# Patient Record
(demographics unavailable — no encounter records)

---

## 2025-01-15 NOTE — HISTORY OF PRESENT ILLNESS
[de-identified] : 23 yo F presenting for newly diagnosed DLBCL    --------------------PmHX:-----------------------------------  No significan PMHx   --------------------Oncologic Hx: -------------------------  Patient  initially presented to [facility] for a 2 week history of progressively worsening neck and facial swelling with associated right shoulder pain. She was found to have a right anterior mediastinal mass with aggressive features, and transferred to [facility] for ongoing management. She reports 2 weeks ago she noticed some bulging in her neck, followed by facial and eye swelling a few days later. She was initially seen in urgent care who prescribed an antibiotic for suspected infection, but had no symptom improvement. Reported difficulty laying flat, and some associated chest discomfort. Reported mild dysphagia.. 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 She was evaluated by gyn for fertility preservation and has received lupron. IR was consulted for line placement, but due to extensive DVT in upper extremity adn swelling, she was unable to get port or UE PICC. To receive C1 of treatment, patient had a femoral PICC line placed which was subsequently removed prior to discharge.   Since her hospital discharge, she reports to be doing much better. She states that her upper exremity swelling has resolved. She states that she still has a non productive cough that is worse when laying flat. She reports having little to no pain and using her prescribed oxy very infrequently.   --------------------Interval Hx: -----------------------------   --------------------Treatment Hx: ------------------------- C1 in hospital: rituximab on 1/3/25 and CHOP on 1/6/25   --------------------ImagingHx:------------------------------   --------------------Pathology Results: -------------------- Initial Pre-treatment Biopsy: Date ---Morphology: ---IHC stain: ---FISH: ---Karyotype: ---NGS:   Post initial treatment: Date  ---Morphology: ---IHC stain: ---FISH: ---Karyotype: ---NGS:   XXXX treatment: Date  ---Morphology: ---IHC stain: ---FISH: ---Karyotype: ---NGS:   --------------------Social Hx: --------------------------------   The patient is marital status and has *** children (age ***, gender ***). Patient has *** siblings (age, gender, health concerns). Work:   --------------------Surgical History: ------------------------     --------------------Past family history:----------------------

## 2025-01-15 NOTE — HISTORY OF PRESENT ILLNESS
[de-identified] : 23 yo F presenting for newly diagnosed DLBCL    --------------------PmHX:-----------------------------------  No significan PMHx   --------------------Oncologic Hx: -------------------------  Patient  initially presented to [facility] for a 2 week history of progressively worsening neck and facial swelling with associated right shoulder pain. She was found to have a right anterior mediastinal mass with aggressive features, and transferred to [facility] for ongoing management. She reports 2 weeks ago she noticed some bulging in her neck, followed by facial and eye swelling a few days later. She was initially seen in urgent care who prescribed an antibiotic for suspected infection, but had no symptom improvement. Reported difficulty laying flat, and some associated chest discomfort. Reported mild dysphagia.. 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 She was evaluated by gyn for fertility preservation and has received lupron. IR was consulted for line placement, but due to extensive DVT in upper extremity adn swelling, she was unable to get port or UE PICC. To receive C1 of treatment, patient had a femoral PICC line placed which was subsequently removed prior to discharge.   Since her hospital discharge, she reports to be doing much better. She states that her upper exremity swelling has resolved. She states that she still has a non productive cough that is worse when laying flat. She reports having little to no pain and using her prescribed oxy very infrequently.   --------------------Interval Hx: -----------------------------   --------------------Treatment Hx: ------------------------- C1 in hospital: rituximab on 1/3/25 and CHOP on 1/6/25   --------------------ImagingHx:------------------------------   --------------------Pathology Results: -------------------- Initial Pre-treatment Biopsy: Date ---Morphology: ---IHC stain: ---FISH: ---Karyotype: ---NGS:   Post initial treatment: Date  ---Morphology: ---IHC stain: ---FISH: ---Karyotype: ---NGS:   XXXX treatment: Date  ---Morphology: ---IHC stain: ---FISH: ---Karyotype: ---NGS:   --------------------Social Hx: --------------------------------   The patient is marital status and has *** children (age ***, gender ***). Patient has *** siblings (age, gender, health concerns). Work:   --------------------Surgical History: ------------------------     --------------------Past family history:----------------------

## 2025-01-23 NOTE — ASSESSMENT
[FreeTextEntry1] : Disease: Primary mediastinal large cell lymphoma Staging: IV (chest wall invasion and pericardial lining invasion) Treatment: C1 HOP inpatient  Course complications: Presented with SVC and extensive bilateral extensive DVTs  Response: Not evaluated yet  Line: Planned for mediport on 1/24/25; orders placed.  Fertility preservation: Lupron monthly (due Feb 3) with Aygestin 5mg/day add-back therapy   ---------------Pre treatment work up: --------------- Echo: EF 64% with moderate pericardial effusion HIV negative  Hepatitic C +, undetectable HCV RNA Hep B total core negative    Patient was seen in office with her mother for dental pain and R jaw pain  Started on amox.clav antibiotics; Interim CT Chest prelim showing substantial resolution of mediastinal mass.  Discussed with IR, planned for Mediport on friday, Blood cultures drawn for mediport placement friday per IR request.   CBC reviewed with neutropenia.  We discussed that if patient were to have fever, given her neutropenia, she should call us immediately and would likely need to go to ED for evaluation. Otherwise, patient should see dentist ASAP as pain and swelling at location of prior root canal.  Planned for C2 on 1/27/25 if neutropenia and infection resolved.  Will monitor Hep C RNA monthly    C2 planned on 1/27/25;  Weekly labs while on treatment  RTC for provider on 1/27/25  Over 45 minutes was spent in reviewing labs, counseling patient and coordinating care.

## 2025-01-23 NOTE — REVIEW OF SYSTEMS
[Diarrhea: Grade 0] : Diarrhea: Grade 0 [Negative] : Allergic/Immunologic [FreeTextEntry4] : dental pain

## 2025-01-23 NOTE — HISTORY OF PRESENT ILLNESS
[de-identified] : 23 yo F presenting for follow up of her PMBCL   --------------------PmHX:-----------------------------------  No significan PMHx   --------------------Oncologic Hx: -------------------------  Patient  initially presented to [facility] for a 2 week history of progressively worsening neck and facial swelling with associated right shoulder pain. She was found to have a right anterior mediastinal mass with aggressive features, and transferred to [facility] for ongoing management. She reports 2 weeks ago she noticed some bulging in her neck, followed by facial and eye swelling a few days later. She was initially seen in urgent care who prescribed an antibiotic for suspected infection, but had no symptom improvement. Reported difficulty laying flat, and some associated chest discomfort. Reported mild dysphagia.. 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 She was evaluated by gyn for fertility preservation and has received lupron. IR was consulted for line placement, but due to extensive DVT in upper extremity adn swelling, she was unable to get port or UE PICC. To receive C1 of treatment, patient had a femoral PICC line placed which was subsequently removed prior to discharge.   Since her hospital discharge, she reports to be doing much better. She states that her upper exremity swelling has resolved. She states that she still has a non productive cough that is worse when laying flat. She reports having little to no pain and using her prescribed oxy very infrequently.    --------------------Treatment Hx: ------------------------- C1 in hospital: rituximab on 1/3/25 and CHOP on 1/6/25   --------------------ImagingHx:------------------------------   Pre treatment CT C/A/P (12/23/24) showed Indeterminate right anterior mediastinal mass 8.5 cm x 7.5 cm x 8.9 cm with aggressive features. Mass effect on the superior vena cava from a right anterior mediastinal mass; the SVC is nearly completely obliterated but  demonstrates contrast within its lumen. Some of the right upper lobe pulmonary vasculature and bronchi appears completely obliterated by the mass.   Pre treatment CT Neck: ( 12/23/24) showed extensive diffuse soft tissue edema throughout the deep and superficial neck soft tissues. Increased number of borderline and mildly enlarged lymph nodes throughout the jugular chain and supraclavicular regions bilaterally, suspicious for metastatic disease.  --------------------Pathology Results: -------------------- Initial Pre-treatment Biopsy: Date ---Morphology: extensively necrotic tissue with focal clusters of viable large atypical cells ---IHC stain: diffuse positivity with CD20, Pax5, and CD79a, focal viable cells show staining with Bcl2, Bcl6, Mum1, PDL1. No definitive staining with WILIAM, CD10, CD30 or CD23. Ki67 appears high.  ---FISH: positive for BCL6 (3q27) breakpoint translocation, negative for MYC, BCL2-IGH, or MYC-IGH Rearrangement.  --------------------Social Hx: --------------------------------   The patient is single, lives with mother.  No siblings  Prior smoker; quit since diagnosis    --------------------Surgical History: ------------------------  N/A   --------------------Past family history:---------------------- No family history of cancer  [de-identified] : 1/21/24: Patient presented today with her mother for emergency visit for a new dental pain and R jaw swelling.  [80: Normal activity with effort; some signs or symptoms of disease.] : 80: Normal activity with effort; some signs or symptoms of disease.

## 2025-01-27 NOTE — ASSESSMENT
[FreeTextEntry1] : Disease: Primary mediastinal large cell lymphoma Staging: IV (chest wall invasion and pericardial lining invasion) Treatment: C1 RCHOP inpatient , C2 today Course complications: Presented with SVC and extensive bilateral extensive DVTs  Response: Not evaluated yet  Line: Mediport   Fertility preservation: Lupron monthly (due Feb 3) with Aygestin 5mg/day add-back therapy   ---------------Pre treatment work up: --------------- Echo: EF 64% with moderate pericardial effusion HIV negative  Hepatitic C +, undetectable HCV RNA Hep B total core negative   Patient was seen in treatment room with her mother.  She is getting C2 R-CHOP today.  Interim CT Chest prelim showing substantial resolution of mediastinal mass.  CBC reviewed with count recovery.  We discussed that if patient were to have fever,  she should call us immediately for evaluation.  We discussed to use reglan for antiemetic instad of zofran for next three days.  Plan for lupron on Feb 4th.  Will monitor Hep C RNA monthly    Weekly labs while on treatment  RTC for provider on 2/11/25  Over 45 minutes was spent in reviewing labs, counseling patient and coordinating care.

## 2025-01-27 NOTE — HISTORY OF PRESENT ILLNESS
[de-identified] : 23 yo F presenting for follow up of her PMBCL   --------------------PmHX:-----------------------------------  No significan PMHx   --------------------Oncologic Hx: -------------------------  Patient  initially presented to [place] [facility] for a 2 week history of progressively worsening neck and facial swelling with associated right shoulder pain. She was found to have a right anterior mediastinal mass with aggressive features, and transferred to [facility] for ongoing management. She reports 2 weeks ago she noticed some bulging in her neck, followed by facial and eye swelling a few days later. She was initially seen in urgent care who prescribed an antibiotic for suspected infection, but had no symptom improvement. Reported difficulty laying flat, and some associated chest discomfort. Reported mild dysphagia.. 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 She was evaluated by gyn for fertility preservation and has received lupron. IR was consulted for line placement, but due to extensive DVT in upper extremity adn swelling, she was unable to get port or UE PICC. To receive C1 of treatment, patient had a femoral PICC line placed which was subsequently removed prior to discharge.   Since her hospital discharge, she reports to be doing much better. She states that her upper exremity swelling has resolved. She states that she still has a non productive cough that is worse when laying flat. She reports having little to no pain and using her prescribed oxy very infrequently.    --------------------Treatment Hx: ------------------------- C1 in hospital: rituximab on 1/3/25 and CHOP on 1/6/25; C2 planned for 1/27/25   --------------------ImagingHx:------------------------------   Pre treatment CT C/A/P (12/23/24) showed Indeterminate right anterior mediastinal mass 8.5 cm x 7.5 cm x 8.9 cm with aggressive features. Mass effect on the superior vena cava from a right anterior mediastinal mass; the SVC is nearly completely obliterated but  demonstrates contrast within its lumen. Some of the right upper lobe pulmonary vasculature and bronchi appears completely obliterated by the mass.   Pre treatment CT Neck: ( 12/23/24) showed extensive diffuse soft tissue edema throughout the deep and superficial neck soft tissues. Increased number of borderline and mildly enlarged lymph nodes throughout the jugular chain and supraclavicular regions bilaterally, suspicious for metastatic disease.  --------------------Pathology Results: -------------------- Initial Pre-treatment Biopsy: Date ---Morphology: extensively necrotic tissue with focal clusters of viable large atypical cells ---IHC stain: diffuse positivity with CD20, Pax5, and CD79a, focal viable cells show staining with Bcl2, Bcl6, Mum1, PDL1. No definitive staining with WILIAM, CD10, CD30 or CD23. Ki67 appears high.  ---FISH: positive for BCL6 (3q27) breakpoint translocation, negative for MYC, BCL2-IGH, or MYC-IGH Rearrangement.  --------------------Social Hx: --------------------------------   The patient is single, lives with mother.  No siblings  Prior smoker; quit since diagnosis    --------------------Surgical History: ------------------------  N/A   --------------------Past family history:---------------------- No family history of cancer  [de-identified] : 1/27/25: Patient presented today with her mother for follow up. She reports that she is doing well post mediport placement that she states went well. Her R jaw swelling has decreased and is back to normal. She did see the dentist who reported there was no infection at the time. She reports no nausea, numbness or tingling, cold like symptoms, fevers.  [80: Normal activity with effort; some signs or symptoms of disease.] : 80: Normal activity with effort; some signs or symptoms of disease.

## 2025-01-27 NOTE — PHYSICAL EXAM
[Fully active, able to carry on all pre-disease performance without restriction] : Status 0 - Fully active, able to carry on all pre-disease performance without restriction [Normal] : affect appropriate [de-identified] : L ant cervical LN 2 x 2cm,

## 2025-02-04 NOTE — PHYSICAL EXAM
[Fully active, able to carry on all pre-disease performance without restriction] : Status 0 - Fully active, able to carry on all pre-disease performance without restriction [Normal] : affect appropriate [de-identified] : L ant cervical LN 2 x 2cm,

## 2025-02-04 NOTE — PHYSICAL EXAM
[Fully active, able to carry on all pre-disease performance without restriction] : Status 0 - Fully active, able to carry on all pre-disease performance without restriction [Normal] : affect appropriate [de-identified] : L ant cervical LN 2 x 2cm,

## 2025-02-06 NOTE — ASSESSMENT
[FreeTextEntry1] : Disease: Primary mediastinal large cell lymphoma Staging: IV (chest wall invasion and pericardial lining invasion) Treatment: C1 RCHOP inpatient , C2 today Course complications: Presented with SVC and extensive bilateral extensive DVTs  Response: Not evaluated yet  Line: Mediport   Fertility preservation: Lupron monthly (due Feb 3)   ---------------Pre treatment work up: --------------- Echo: EF 64% with moderate pericardial effusion HIV negative  Hepatitic C +, undetectable HCV RNA Hep B total core negative   Patient was seen in treatment room with her mother.  She is s/p C2 R-CHOP; uncontrolled nausea. Nausea regimen optimized with replacement of reglan for compazine and addition of 8mg Zofran PRN CBC reviewed; ANC <1000; started on levaquin.  We discussed that if patient were to have fever,  she should call us immediately for evaluation.  Plan for lupron asap; patient did not schedule her treatment appointments at last visit Will monitor Hep C RNA monthly    Weekly labs while on treatment  RTC for provider on 2/11/25  Over 45 minutes was spent in reviewing labs, counseling patient and coordinating care.

## 2025-02-06 NOTE — HISTORY OF PRESENT ILLNESS
[80: Normal activity with effort; some signs or symptoms of disease.] : 80: Normal activity with effort; some signs or symptoms of disease.  [de-identified] : 21 yo F presenting for follow up of her PMBCL   --------------------PmHX:-----------------------------------  No significan PMHx   --------------------Oncologic Hx: -------------------------  Patient  initially presented to [facility] for a 2 week history of progressively worsening neck and facial swelling with associated right shoulder pain. She was found to have a right anterior mediastinal mass with aggressive features, and transferred to [facility] for ongoing management. She reports 2 weeks ago she noticed some bulging in her neck, followed by facial and eye swelling a few days later. She was initially seen in urgent care who prescribed an antibiotic for suspected infection, but had no symptom improvement. Reported difficulty laying flat, and some associated chest discomfort. Reported mild dysphagia.. 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 She was evaluated by gyn for fertility preservation and has received lupron. IR was consulted for line placement, but due to extensive DVT in upper extremity adn swelling, she was unable to get port or UE PICC. To receive C1 of treatment, patient had a femoral PICC line placed which was subsequently removed prior to discharge.   Since her hospital discharge, she reports to be doing much better. She states that her upper exremity swelling has resolved. She states that she still has a non productive cough that is worse when laying flat. She reports having little to no pain and using her prescribed oxy very infrequently.    --------------------Treatment Hx: ------------------------- C1 in hospital: rituximab on 1/3/25 and CHOP on 1/6/25; C2 planned for 1/27/25   --------------------ImagingHx:------------------------------   Pre treatment CT C/A/P (12/23/24) showed Indeterminate right anterior mediastinal mass 8.5 cm x 7.5 cm x 8.9 cm with aggressive features. Mass effect on the superior vena cava from a right anterior mediastinal mass; the SVC is nearly completely obliterated but  demonstrates contrast within its lumen. Some of the right upper lobe pulmonary vasculature and bronchi appears completely obliterated by the mass.   Pre treatment CT Neck: ( 12/23/24) showed extensive diffuse soft tissue edema throughout the deep and superficial neck soft tissues. Increased number of borderline and mildly enlarged lymph nodes throughout the jugular chain and supraclavicular regions bilaterally, suspicious for metastatic disease.  --------------------Pathology Results: -------------------- Initial Pre-treatment Biopsy: Date ---Morphology: extensively necrotic tissue with focal clusters of viable large atypical cells ---IHC stain: diffuse positivity with CD20, Pax5, and CD79a, focal viable cells show staining with Bcl2, Bcl6, Mum1, PDL1. No definitive staining with WILIAM, CD10, CD30 or CD23. Ki67 appears high.  ---FISH: positive for BCL6 (3q27) breakpoint translocation, negative for MYC, BCL2-IGH, or MYC-IGH Rearrangement.  --------------------Social Hx: --------------------------------   The patient is single, lives with mother.  No siblings  Prior smoker; quit since diagnosis    --------------------Surgical History: ------------------------  N/A   --------------------Past family history:---------------------- No family history of cancer  [de-identified] : 2/4/25: Patient presented today with her mother for follow up. She reports having significant nausea. She is taking reglan every 6 hours and zofran (unclear the dose) intermittently.  She also reports to be more fatigued than previously. She reports no numbness or tingling, cold like symptoms, fevers.

## 2025-02-06 NOTE — HISTORY OF PRESENT ILLNESS
[80: Normal activity with effort; some signs or symptoms of disease.] : 80: Normal activity with effort; some signs or symptoms of disease.  [de-identified] : 21 yo F presenting for follow up of her PMBCL   --------------------PmHX:-----------------------------------  No significan PMHx   --------------------Oncologic Hx: -------------------------  Patient  initially presented to [facility] for a 2 week history of progressively worsening neck and facial swelling with associated right shoulder pain. She was found to have a right anterior mediastinal mass with aggressive features, and transferred to [facility] for ongoing management. She reports 2 weeks ago she noticed some bulging in her neck, followed by facial and eye swelling a few days later. She was initially seen in urgent care who prescribed an antibiotic for suspected infection, but had no symptom improvement. Reported difficulty laying flat, and some associated chest discomfort. Reported mild dysphagia.. 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 She was evaluated by gyn for fertility preservation and has received lupron. IR was consulted for line placement, but due to extensive DVT in upper extremity adn swelling, she was unable to get port or UE PICC. To receive C1 of treatment, patient had a femoral PICC line placed which was subsequently removed prior to discharge.   Since her hospital discharge, she reports to be doing much better. She states that her upper exremity swelling has resolved. She states that she still has a non productive cough that is worse when laying flat. She reports having little to no pain and using her prescribed oxy very infrequently.    --------------------Treatment Hx: ------------------------- C1 in hospital: rituximab on 1/3/25 and CHOP on 1/6/25; C2 planned for 1/27/25   --------------------ImagingHx:------------------------------   Pre treatment CT C/A/P (12/23/24) showed Indeterminate right anterior mediastinal mass 8.5 cm x 7.5 cm x 8.9 cm with aggressive features. Mass effect on the superior vena cava from a right anterior mediastinal mass; the SVC is nearly completely obliterated but  demonstrates contrast within its lumen. Some of the right upper lobe pulmonary vasculature and bronchi appears completely obliterated by the mass.   Pre treatment CT Neck: ( 12/23/24) showed extensive diffuse soft tissue edema throughout the deep and superficial neck soft tissues. Increased number of borderline and mildly enlarged lymph nodes throughout the jugular chain and supraclavicular regions bilaterally, suspicious for metastatic disease.  --------------------Pathology Results: -------------------- Initial Pre-treatment Biopsy: Date ---Morphology: extensively necrotic tissue with focal clusters of viable large atypical cells ---IHC stain: diffuse positivity with CD20, Pax5, and CD79a, focal viable cells show staining with Bcl2, Bcl6, Mum1, PDL1. No definitive staining with WILIAM, CD10, CD30 or CD23. Ki67 appears high.  ---FISH: positive for BCL6 (3q27) breakpoint translocation, negative for MYC, BCL2-IGH, or MYC-IGH Rearrangement.  --------------------Social Hx: --------------------------------   The patient is single, lives with mother.  No siblings  Prior smoker; quit since diagnosis    --------------------Surgical History: ------------------------  N/A   --------------------Past family history:---------------------- No family history of cancer  [de-identified] : 2/4/25: Patient presented today with her mother for follow up. She reports having significant nausea. She is taking reglan every 6 hours and zofran (unclear the dose) intermittently.  She also reports to be more fatigued than previously. She reports no numbness or tingling, cold like symptoms, fevers.

## 2025-03-04 NOTE — HISTORY OF PRESENT ILLNESS
[90: Able to carry normal activity; minor signs or symptoms of disease.] : 90: Able to carry normal activity; minor signs or symptoms of disease.  [de-identified] : 21 yo F presenting for follow up of her PMBCL   --------------------PmHX:-----------------------------------  No significan PMHx   --------------------Oncologic Hx: -------------------------  Patient  initially presented to [facility] for a 2 week history of progressively worsening neck and facial swelling with associated right shoulder pain. She was found to have a right anterior mediastinal mass with aggressive features, and transferred to [facility] for ongoing management. She reports 2 weeks ago she noticed some bulging in her neck, followed by facial and eye swelling a few days later. She was initially seen in urgent care who prescribed an antibiotic for suspected infection, but had no symptom improvement. Reported difficulty laying flat, and some associated chest discomfort. Reported mild dysphagia.. 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 She was evaluated by gyn for fertility preservation and has received lupron. IR was consulted for line placement, but due to extensive DVT in upper extremity adn swelling, she was unable to get port or UE PICC. To receive C1 of treatment, patient had a femoral PICC line placed which was subsequently removed prior to discharge.   Since her hospital discharge, she reports to be doing much better. She states that her upper exremity swelling has resolved. She states that she still has a non productive cough that is worse when laying flat. She reports having little to no pain and using her prescribed oxy very infrequently.    --------------------Treatment Hx: ------------------------- C1 in hospital: rituximab on 1/3/25 and CHOP on 1/6/25; C2 1/27/25 C3 2/17/25   --------------------ImagingHx:------------------------------   Pre treatment CT C/A/P (12/23/24) showed Indeterminate right anterior mediastinal mass 8.5 cm x 7.5 cm x 8.9 cm with aggressive features. Mass effect on the superior vena cava from a right anterior mediastinal mass; the SVC is nearly completely obliterated but  demonstrates contrast within its lumen. Some of the right upper lobe pulmonary vasculature and bronchi appears completely obliterated by the mass.   Pre treatment CT Neck: ( 12/23/24) showed extensive diffuse soft tissue edema throughout the deep and superficial neck soft tissues. Increased number of borderline and mildly enlarged lymph nodes throughout the jugular chain and supraclavicular regions bilaterally, suspicious for metastatic disease.  Post C1 CT 1/20/25 (for line placement) Decrease in size of the right anterior mediastinal mass with decreased mass effect and invasion compared to prior.   --------------------Pathology Results: -------------------- Initial Pre-treatment Biopsy: Date ---Morphology: extensively necrotic tissue with focal clusters of viable large atypical cells ---IHC stain: diffuse positivity with CD20, Pax5, and CD79a, focal viable cells show staining with Bcl2, Bcl6, Mum1, PDL1. No definitive staining with WILIAM, CD10, CD30 or CD23. Ki67 appears high.  ---FISH: positive for BCL6 (3q27) breakpoint translocation, negative for MYC, BCL2-IGH, or MYC-IGH Rearrangement.  --------------------Social Hx: --------------------------------   The patient is single, lives with mother.  No siblings  Prior smoker; quit since diagnosis    --------------------Surgical History: ------------------------  N/A   --------------------Past family history:---------------------- No family history of cancer  [de-identified] : 3/4/25: Patient presented today for follow up.She reports to be doing well overall. She states that her insomnia has improved. She denies any new SOB, chest pain, numbness or tingling, fevers, cold like symptoms.

## 2025-03-04 NOTE — HISTORY OF PRESENT ILLNESS
[90: Able to carry normal activity; minor signs or symptoms of disease.] : 90: Able to carry normal activity; minor signs or symptoms of disease.  [de-identified] : 21 yo F presenting for follow up of her PMBCL   --------------------PmHX:-----------------------------------  No significan PMHx   --------------------Oncologic Hx: -------------------------  Patient  initially presented to [facility] for a 2 week history of progressively worsening neck and facial swelling with associated right shoulder pain. She was found to have a right anterior mediastinal mass with aggressive features, and transferred to [facility] for ongoing management. She reports 2 weeks ago she noticed some bulging in her neck, followed by facial and eye swelling a few days later. She was initially seen in urgent care who prescribed an antibiotic for suspected infection, but had no symptom improvement. Reported difficulty laying flat, and some associated chest discomfort. Reported mild dysphagia.. 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 She was evaluated by gyn for fertility preservation and has received lupron. IR was consulted for line placement, but due to extensive DVT in upper extremity adn swelling, she was unable to get port or UE PICC. To receive C1 of treatment, patient had a femoral PICC line placed which was subsequently removed prior to discharge.   Since her hospital discharge, she reports to be doing much better. She states that her upper exremity swelling has resolved. She states that she still has a non productive cough that is worse when laying flat. She reports having little to no pain and using her prescribed oxy very infrequently.    --------------------Treatment Hx: ------------------------- C1 in hospital: rituximab on 1/3/25 and CHOP on 1/6/25; C2 1/27/25 C3 2/17/25   --------------------ImagingHx:------------------------------   Pre treatment CT C/A/P (12/23/24) showed Indeterminate right anterior mediastinal mass 8.5 cm x 7.5 cm x 8.9 cm with aggressive features. Mass effect on the superior vena cava from a right anterior mediastinal mass; the SVC is nearly completely obliterated but  demonstrates contrast within its lumen. Some of the right upper lobe pulmonary vasculature and bronchi appears completely obliterated by the mass.   Pre treatment CT Neck: ( 12/23/24) showed extensive diffuse soft tissue edema throughout the deep and superficial neck soft tissues. Increased number of borderline and mildly enlarged lymph nodes throughout the jugular chain and supraclavicular regions bilaterally, suspicious for metastatic disease.  Post C1 CT 1/20/25 (for line placement) Decrease in size of the right anterior mediastinal mass with decreased mass effect and invasion compared to prior.   --------------------Pathology Results: -------------------- Initial Pre-treatment Biopsy: Date ---Morphology: extensively necrotic tissue with focal clusters of viable large atypical cells ---IHC stain: diffuse positivity with CD20, Pax5, and CD79a, focal viable cells show staining with Bcl2, Bcl6, Mum1, PDL1. No definitive staining with WILIAM, CD10, CD30 or CD23. Ki67 appears high.  ---FISH: positive for BCL6 (3q27) breakpoint translocation, negative for MYC, BCL2-IGH, or MYC-IGH Rearrangement.  --------------------Social Hx: --------------------------------   The patient is single, lives with mother.  No siblings  Prior smoker; quit since diagnosis    --------------------Surgical History: ------------------------  N/A   --------------------Past family history:---------------------- No family history of cancer  [de-identified] : 3/4/25: Patient presented today for follow up.She reports to be doing well overall. She states that her insomnia has improved. She denies any new SOB, chest pain, numbness or tingling, fevers, cold like symptoms.

## 2025-03-04 NOTE — ASSESSMENT
[FreeTextEntry1] : Disease: Primary mediastinal large cell lymphoma Staging: IV (chest wall invasion and pericardial lining invasion) Treatment: s/p C3 RCHOP Course complications: Presented with SVC and extensive bilateral extensive DVTs  Response: Not evaluated yet  Line: Mediport   Fertility preservation: Lupron monthly (due March 7)   ---------------Pre treatment work up: --------------- Echo: EF 64% with moderate pericardial effusion HIV negative  Hepatitic C +, undetectable HCV RNA Hep B total core negative    She is s/p C3 R-CHOP; C4 planned for 3/10/25 constipation: improved with current regimen  CBC reviewed;  Interim PET CT to be completed on 3/6/25  Will monitor Hep C RNA monthly  (checked today) Weekly labs while on treatment  C4 on 3/10/25 RTC for provider on 3/4/25  Over 45 minutes was spent in reviewing labs, counseling patient and coordinating care.

## 2025-03-25 NOTE — ASSESSMENT
[FreeTextEntry1] : Disease: Primary mediastinal large cell lymphoma Staging: IV (chest wall invasion and pericardial lining invasion) Treatment: s/p C4 RCHOP Course complications: Presented with SVC and extensive bilateral extensive DVTs  Response: Not evaluated yet  Line: Mediport   Fertility preservation: Lupron monthly (due April 7)   ---------------Pre treatment work up: --------------- Echo: EF 64% with moderate pericardial effusion HIV negative  Hepatitic C +, undetectable HCV RNA Hep B total core negative    She is s/p C4 R-CHOP; C5 planned for 3/31/25 interim PET CT after C3 with response, mild remaing FDG circumferential read as deauville 4; but SUV if 6.2, Will continue with 3 cycles R-CHOP and repeat imaging 6 weeks post last treatment.  constipation: Will start milk of magnesia for constiaption x 3 days  Nausea: Start olanzapine for 14 days starting from Day1 of chemotherapy. Utilize reglan PRN in first 3 days post chemo, then can use zofran as needed as well. EKG completed today with QTC <400.    Will monitor Hep C RNA monthly  ( last 3/4/25 not detected)  Weekly labs while on treatment  C5 on 3/31/25 RTC for provider on 4/15/25  Over 45 minutes was spent in reviewing labs, counseling patient and coordinating care.

## 2025-03-25 NOTE — HISTORY OF PRESENT ILLNESS
[de-identified] : 21 yo F presenting for follow up of her PMBCL   --------------------PmHX:-----------------------------------  No significan PMHx   --------------------Oncologic Hx: -------------------------  Patient  initially presented to [facility] for a 2 week history of progressively worsening neck and facial swelling with associated right shoulder pain. She was found to have a right anterior mediastinal mass with aggressive features, and transferred to [facility] for ongoing management. She reports 2 weeks ago she noticed some bulging in her neck, followed by facial and eye swelling a few days later. She was initially seen in urgent care who prescribed an antibiotic for suspected infection, but had no symptom improvement. Reported difficulty laying flat, and some associated chest discomfort. Reported mild dysphagia.. 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 She was evaluated by gyn for fertility preservation and has received lupron. IR was consulted for line placement, but due to extensive DVT in upper extremity adn swelling, she was unable to get port or UE PICC. To receive C1 of treatment, patient had a femoral PICC line placed which was subsequently removed prior to discharge.   Since her hospital discharge, she reports to be doing much better. She states that her upper exremity swelling has resolved. She states that she still has a non productive cough that is worse when laying flat. She reports having little to no pain and using her prescribed oxy very infrequently.    --------------------Treatment Hx: ------------------------- C1 in hospital: rituximab on 1/3/25 and CHOP on 1/6/25; C2 1/27/25 C3 2/17/25, C4 on 3/10/24   --------------------ImagingHx:------------------------------   Pre treatment CT C/A/P (12/23/24) showed Indeterminate right anterior mediastinal mass 8.5 cm x 7.5 cm x 8.9 cm with aggressive features. Mass effect on the superior vena cava from a right anterior mediastinal mass; the SVC is nearly completely obliterated but  demonstrates contrast within its lumen. Some of the right upper lobe pulmonary vasculature and bronchi appears completely obliterated by the mass.   Pre treatment CT Neck: ( 12/23/24) showed extensive diffuse soft tissue edema throughout the deep and superficial neck soft tissues. Increased number of borderline and mildly enlarged lymph nodes throughout the jugular chain and supraclavicular regions bilaterally, suspicious for metastatic disease.  Post C1 CT 1/20/25 (for line placement) Decrease in size of the right anterior mediastinal mass with decreased mass effect and invasion compared to prior.  PET CT s/p 3 cycles: Mildly FDG avid centrally necrotic right mediastinal mass is decreased in size since CT dated 1/20/2025 (SUV 6.2)   --------------------Pathology Results: -------------------- Initial Pre-treatment Biopsy: Date ---Morphology: extensively necrotic tissue with focal clusters of viable large atypical cells ---IHC stain: diffuse positivity with CD20, Pax5, and CD79a, focal viable cells show staining with Bcl2, Bcl6, Mum1, PDL1. No definitive staining with WILIAM, CD10, CD30 or CD23. Ki67 appears high.  ---FISH: positive for BCL6 (3q27) breakpoint translocation, negative for MYC, BCL2-IGH, or MYC-IGH Rearrangement.  --------------------Social Hx: --------------------------------   The patient is single, lives with mother.  No siblings  Prior smoker; quit since diagnosis    --------------------Surgical History: ------------------------  N/A   --------------------Past family history:---------------------- No family history of cancer  [de-identified] : 3/25/25: Patient presented today for follow up.She reports to be doing well overall. She does report signifcna tnausea despite using compazine and zofran all of last week. She also reports significant consitpation requiring lactulose for several days.  She denies any new SOB, chest pain, numbness or tingling, fevers, cold like symptoms.   Detail Level: Simple Instructions: This plan will send the code FBSE to the PM system.  DO NOT or CHANGE the price. Price (Do Not Change): 0.00

## 2025-04-17 NOTE — ASSESSMENT
[FreeTextEntry1] : Disease: Primary mediastinal large cell lymphoma Staging: IV (chest wall invasion and pericardial lining invasion) Treatment: s/p C5 RCHOP Course complications: Presented with SVC and extensive bilateral extensive DVTs  Response: Not evaluated yet  Line: Mediport   Fertility preservation: Lupron monthly (due today)   ---------------Pre treatment work up: --------------- Echo: EF 64% with moderate pericardial effusion HIV negative  Hepatitic C +, undetectable HCV RNA Hep B total core negative    She is s/p C5 R-CHOP; C6 planned for 4/21/25 interim PET CT after C3 with response, mild remaing FDG circumferential read as deauville 4; but SUV if 6.2,  End of treatment PET CT  6 weeks post C6 ordered today. Will schedule   constipation: improved with daily senna use only Nausea: will continue olanzapine for cycle 6. Utilize reglan PRN in first 3 days post chemo, then can use zofran as needed as well. EKG completed today with QTC <400.   Will monitor Hep C RNA monthly  ( last 4/15/25 not detected)  Weekly labs while on treatment  C6 on 4/21/25 RTC for provier visit 6 weeks post C6.   Over 45 minutes was spent in reviewing labs, counseling patient and coordinating care.

## 2025-04-17 NOTE — REVIEW OF SYSTEMS
[Constipation] : constipation [Diarrhea: Grade 0] : Diarrhea: Grade 0 [Negative] : Allergic/Immunologic [FreeTextEntry4] : dental pain

## 2025-04-17 NOTE — HISTORY OF PRESENT ILLNESS
[90: Able to carry normal activity; minor signs or symptoms of disease.] : 90: Able to carry normal activity; minor signs or symptoms of disease.  [de-identified] : 21 yo F presenting for follow up of her PMBCL   --------------------PmHX:-----------------------------------  No significan PMHx   --------------------Oncologic Hx: -------------------------  Patient  initially presented to [facility] for a 2 week history of progressively worsening neck and facial swelling with associated right shoulder pain. She was found to have a right anterior mediastinal mass with aggressive features, and transferred to [facility] for ongoing management. She reports 2 weeks ago she noticed some bulging in her neck, followed by facial and eye swelling a few days later. She was initially seen in urgent care who prescribed an antibiotic for suspected infection, but had no symptom improvement. Reported difficulty laying flat, and some associated chest discomfort. Reported mild dysphagia.. 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 She was evaluated by gyn for fertility preservation and has received lupron. IR was consulted for line placement, but due to extensive DVT in upper extremity adn swelling, she was unable to get port or UE PICC. To receive C1 of treatment, patient had a femoral PICC line placed which was subsequently removed prior to discharge.   Since her hospital discharge, she reports to be doing much better. She states that her upper exremity swelling has resolved. She states that she still has a non productive cough that is worse when laying flat. She reports having little to no pain and using her prescribed oxy very infrequently.    --------------------Treatment Hx: ------------------------- C1 in hospital: rituximab on 1/3/25 and CHOP on 1/6/25; C2 1/27/25 C3 2/17/25, C4 on 3/10/24, C5 on 4/1/25   --------------------ImagingHx:------------------------------   Pre treatment CT C/A/P (12/23/24) showed Indeterminate right anterior mediastinal mass 8.5 cm x 7.5 cm x 8.9 cm with aggressive features. Mass effect on the superior vena cava from a right anterior mediastinal mass; the SVC is nearly completely obliterated but  demonstrates contrast within its lumen. Some of the right upper lobe pulmonary vasculature and bronchi appears completely obliterated by the mass.   Pre treatment CT Neck: ( 12/23/24) showed extensive diffuse soft tissue edema throughout the deep and superficial neck soft tissues. Increased number of borderline and mildly enlarged lymph nodes throughout the jugular chain and supraclavicular regions bilaterally, suspicious for metastatic disease.  Post C1 CT 1/20/25 (for line placement) Decrease in size of the right anterior mediastinal mass with decreased mass effect and invasion compared to prior.  PET CT s/p 3 cycles: Mildly FDG avid centrally necrotic right mediastinal mass is decreased in size since CT dated 1/20/2025 (SUV 6.2)   --------------------Pathology Results: -------------------- Initial Pre-treatment Biopsy: Date ---Morphology: extensively necrotic tissue with focal clusters of viable large atypical cells ---IHC stain: diffuse positivity with CD20, Pax5, and CD79a, focal viable cells show staining with Bcl2, Bcl6, Mum1, PDL1. No definitive staining with WILIAM, CD10, CD30 or CD23. Ki67 appears high.  ---FISH: positive for BCL6 (3q27) breakpoint translocation, negative for MYC, BCL2-IGH, or MYC-IGH Rearrangement.  --------------------Social Hx: --------------------------------   The patient is single, lives with mother.  No siblings  Prior smoker; quit since diagnosis    --------------------Surgical History: ------------------------  N/A   --------------------Past family history:---------------------- No family history of cancer  [de-identified] : 4/15/25: Patient presented today for follow up.She reports to be doing well overall. Her nausea is improved.her constipation is improved with daily senna use. She denies any new SOB, chest pain, numbness or tingling, fevers, cold like symptoms.

## 2025-04-17 NOTE — HISTORY OF PRESENT ILLNESS
[90: Able to carry normal activity; minor signs or symptoms of disease.] : 90: Able to carry normal activity; minor signs or symptoms of disease.  [de-identified] : 23 yo F presenting for follow up of her PMBCL   --------------------PmHX:-----------------------------------  No significan PMHx   --------------------Oncologic Hx: -------------------------  Patient  initially presented to [facility] for a 2 week history of progressively worsening neck and facial swelling with associated right shoulder pain. She was found to have a right anterior mediastinal mass with aggressive features, and transferred to [facility] for ongoing management. She reports 2 weeks ago she noticed some bulging in her neck, followed by facial and eye swelling a few days later. She was initially seen in urgent care who prescribed an antibiotic for suspected infection, but had no symptom improvement. Reported difficulty laying flat, and some associated chest discomfort. Reported mild dysphagia.. 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 She was evaluated by gyn for fertility preservation and has received lupron. IR was consulted for line placement, but due to extensive DVT in upper extremity adn swelling, she was unable to get port or UE PICC. To receive C1 of treatment, patient had a femoral PICC line placed which was subsequently removed prior to discharge.   Since her hospital discharge, she reports to be doing much better. She states that her upper exremity swelling has resolved. She states that she still has a non productive cough that is worse when laying flat. She reports having little to no pain and using her prescribed oxy very infrequently.    --------------------Treatment Hx: ------------------------- C1 in hospital: rituximab on 1/3/25 and CHOP on 1/6/25; C2 1/27/25 C3 2/17/25, C4 on 3/10/24, C5 on 4/1/25   --------------------ImagingHx:------------------------------   Pre treatment CT C/A/P (12/23/24) showed Indeterminate right anterior mediastinal mass 8.5 cm x 7.5 cm x 8.9 cm with aggressive features. Mass effect on the superior vena cava from a right anterior mediastinal mass; the SVC is nearly completely obliterated but  demonstrates contrast within its lumen. Some of the right upper lobe pulmonary vasculature and bronchi appears completely obliterated by the mass.   Pre treatment CT Neck: ( 12/23/24) showed extensive diffuse soft tissue edema throughout the deep and superficial neck soft tissues. Increased number of borderline and mildly enlarged lymph nodes throughout the jugular chain and supraclavicular regions bilaterally, suspicious for metastatic disease.  Post C1 CT 1/20/25 (for line placement) Decrease in size of the right anterior mediastinal mass with decreased mass effect and invasion compared to prior.  PET CT s/p 3 cycles: Mildly FDG avid centrally necrotic right mediastinal mass is decreased in size since CT dated 1/20/2025 (SUV 6.2)   --------------------Pathology Results: -------------------- Initial Pre-treatment Biopsy: Date ---Morphology: extensively necrotic tissue with focal clusters of viable large atypical cells ---IHC stain: diffuse positivity with CD20, Pax5, and CD79a, focal viable cells show staining with Bcl2, Bcl6, Mum1, PDL1. No definitive staining with WILIAM, CD10, CD30 or CD23. Ki67 appears high.  ---FISH: positive for BCL6 (3q27) breakpoint translocation, negative for MYC, BCL2-IGH, or MYC-IGH Rearrangement.  --------------------Social Hx: --------------------------------   The patient is single, lives with mother.  No siblings  Prior smoker; quit since diagnosis    --------------------Surgical History: ------------------------  N/A   --------------------Past family history:---------------------- No family history of cancer  [de-identified] : 4/15/25: Patient presented today for follow up.She reports to be doing well overall. Her nausea is improved.her constipation is improved with daily senna use. She denies any new SOB, chest pain, numbness or tingling, fevers, cold like symptoms.

## 2025-05-04 NOTE — REVIEW OF SYSTEMS
[Constipation] : constipation [Diarrhea: Grade 0] : Diarrhea: Grade 0 [Negative] : Allergic/Immunologic [Dysphagia] : no dysphagia [Loss of Hearing] : no loss of hearing [Nosebleeds] : no nosebleeds [Hoarseness] : no hoarseness [Odynophagia] : no odynophagia [Mucosal Pain] : no mucosal pain [FreeTextEntry4] : fullness of ears and sinuses

## 2025-05-04 NOTE — PHYSICAL EXAM
[Fully active, able to carry on all pre-disease performance without restriction] : Status 0 - Fully active, able to carry on all pre-disease performance without restriction [Normal] : affect appropriate [de-identified] : Frontal sinus pressure to palpation

## 2025-05-04 NOTE — HISTORY OF PRESENT ILLNESS
[90: Able to carry normal activity; minor signs or symptoms of disease.] : 90: Able to carry normal activity; minor signs or symptoms of disease.  [de-identified] : 21 yo F presenting for follow up of her PMBCL   --------------------PmHX:-----------------------------------  No significan PMHx   --------------------Oncologic Hx: -------------------------  Patient  initially presented to [facility] for a 2 week history of progressively worsening neck and facial swelling with associated right shoulder pain. She was found to have a right anterior mediastinal mass with aggressive features, and transferred to [facility] for ongoing management. She reports 2 weeks ago she noticed some bulging in her neck, followed by facial and eye swelling a few days later. She was initially seen in urgent care who prescribed an antibiotic for suspected infection, but had no symptom improvement. Reported difficulty laying flat, and some associated chest discomfort. Reported mild dysphagia.. 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 She was evaluated by gyn for fertility preservation and has received lupron. IR was consulted for line placement, but due to extensive DVT in upper extremity adn swelling, she was unable to get port or UE PICC. To receive C1 of treatment, patient had a femoral PICC line placed which was subsequently removed prior to discharge.   Since her hospital discharge, she reports to be doing much better. She states that her upper exremity swelling has resolved. She states that she still has a non productive cough that is worse when laying flat. She reports having little to no pain and using her prescribed oxy very infrequently.    --------------------Treatment Hx: ------------------------- C1 in hospital: rituximab on 1/3/25 and CHOP on 1/6/25; C2 1/27/25 C3 2/17/25, C4 on 3/10/24, C5 on 4/1/25, C6 on 4/21/25   --------------------ImagingHx:------------------------------   Pre treatment CT C/A/P (12/23/24) showed Indeterminate right anterior mediastinal mass 8.5 cm x 7.5 cm x 8.9 cm with aggressive features. Mass effect on the superior vena cava from a right anterior mediastinal mass; the SVC is nearly completely obliterated but  demonstrates contrast within its lumen. Some of the right upper lobe pulmonary vasculature and bronchi appears completely obliterated by the mass.   Pre treatment CT Neck: ( 12/23/24) showed extensive diffuse soft tissue edema throughout the deep and superficial neck soft tissues. Increased number of borderline and mildly enlarged lymph nodes throughout the jugular chain and supraclavicular regions bilaterally, suspicious for metastatic disease.  Post C1 CT 1/20/25 (for line placement) Decrease in size of the right anterior mediastinal mass with decreased mass effect and invasion compared to prior.  PET CT s/p 3 cycles: Mildly FDG avid centrally necrotic right mediastinal mass is decreased in size since CT dated 1/20/2025 (SUV 6.2)   --------------------Pathology Results: -------------------- Initial Pre-treatment Biopsy: Date ---Morphology: extensively necrotic tissue with focal clusters of viable large atypical cells ---IHC stain: diffuse positivity with CD20, Pax5, and CD79a, focal viable cells show staining with Bcl2, Bcl6, Mum1, PDL1. No definitive staining with WILIAM, CD10, CD30 or CD23. Ki67 appears high.  ---FISH: positive for BCL6 (3q27) breakpoint translocation, negative for MYC, BCL2-IGH, or MYC-IGH Rearrangement.  --------------------Social Hx: --------------------------------   The patient is single, lives with mother.  No siblings  Prior smoker; quit since diagnosis    --------------------Surgical History: ------------------------  N/A   --------------------Past family history:---------------------- No family history of cancer  [de-identified] : 4/29/25: Patient is seen for a follow up visit. She had been having cold sweats ,runny nose,fullness of ears,cough with yellow/green sputum and severe frontal headache. She denies any new SOB, chest pain, numbness or tingling, fevers. She was tested for Flu/COVID/Strep throat few days ago, which were negative.

## 2025-05-04 NOTE — HISTORY OF PRESENT ILLNESS
[90: Able to carry normal activity; minor signs or symptoms of disease.] : 90: Able to carry normal activity; minor signs or symptoms of disease.  [de-identified] : 21 yo F presenting for follow up of her PMBCL   --------------------PmHX:-----------------------------------  No significan PMHx   --------------------Oncologic Hx: -------------------------  Patient  initially presented to [facility] for a 2 week history of progressively worsening neck and facial swelling with associated right shoulder pain. She was found to have a right anterior mediastinal mass with aggressive features, and transferred to [facility] for ongoing management. She reports 2 weeks ago she noticed some bulging in her neck, followed by facial and eye swelling a few days later. She was initially seen in urgent care who prescribed an antibiotic for suspected infection, but had no symptom improvement. Reported difficulty laying flat, and some associated chest discomfort. Reported mild dysphagia.. 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 She was evaluated by gyn for fertility preservation and has received lupron. IR was consulted for line placement, but due to extensive DVT in upper extremity adn swelling, she was unable to get port or UE PICC. To receive C1 of treatment, patient had a femoral PICC line placed which was subsequently removed prior to discharge.   Since her hospital discharge, she reports to be doing much better. She states that her upper exremity swelling has resolved. She states that she still has a non productive cough that is worse when laying flat. She reports having little to no pain and using her prescribed oxy very infrequently.    --------------------Treatment Hx: ------------------------- C1 in hospital: rituximab on 1/3/25 and CHOP on 1/6/25; C2 1/27/25 C3 2/17/25, C4 on 3/10/24, C5 on 4/1/25, C6 on 4/21/25   --------------------ImagingHx:------------------------------   Pre treatment CT C/A/P (12/23/24) showed Indeterminate right anterior mediastinal mass 8.5 cm x 7.5 cm x 8.9 cm with aggressive features. Mass effect on the superior vena cava from a right anterior mediastinal mass; the SVC is nearly completely obliterated but  demonstrates contrast within its lumen. Some of the right upper lobe pulmonary vasculature and bronchi appears completely obliterated by the mass.   Pre treatment CT Neck: ( 12/23/24) showed extensive diffuse soft tissue edema throughout the deep and superficial neck soft tissues. Increased number of borderline and mildly enlarged lymph nodes throughout the jugular chain and supraclavicular regions bilaterally, suspicious for metastatic disease.  Post C1 CT 1/20/25 (for line placement) Decrease in size of the right anterior mediastinal mass with decreased mass effect and invasion compared to prior.  PET CT s/p 3 cycles: Mildly FDG avid centrally necrotic right mediastinal mass is decreased in size since CT dated 1/20/2025 (SUV 6.2)   --------------------Pathology Results: -------------------- Initial Pre-treatment Biopsy: Date ---Morphology: extensively necrotic tissue with focal clusters of viable large atypical cells ---IHC stain: diffuse positivity with CD20, Pax5, and CD79a, focal viable cells show staining with Bcl2, Bcl6, Mum1, PDL1. No definitive staining with WILIAM, CD10, CD30 or CD23. Ki67 appears high.  ---FISH: positive for BCL6 (3q27) breakpoint translocation, negative for MYC, BCL2-IGH, or MYC-IGH Rearrangement.  --------------------Social Hx: --------------------------------   The patient is single, lives with mother.  No siblings  Prior smoker; quit since diagnosis    --------------------Surgical History: ------------------------  N/A   --------------------Past family history:---------------------- No family history of cancer  [de-identified] : 4/29/25: Patient is seen for a follow up visit. She had been having cold sweats ,runny nose,fullness of ears,cough with yellow/green sputum and severe frontal headache. She denies any new SOB, chest pain, numbness or tingling, fevers. She was tested for Flu/COVID/Strep throat few days ago, which were negative.

## 2025-05-04 NOTE — ASSESSMENT
[FreeTextEntry1] : Disease: Primary mediastinal large cell lymphoma Staging: IV (chest wall invasion and pericardial lining invasion) Treatment: s/p C6 RCHOP Course complications: Presented with SVC and extensive bilateral extensive DVTs  Response: Interim Pet CT s/p 3 cycles: Deauville 3  Line: Mediport   Fertility preservation: Lupron monthly last given on 4/17/25  ---------------Pre treatment work up: --------------- Echo: EF 64% with moderate pericardial effusion HIV negative  Hepatitic C +, undetectable HCV RNA Hep B total core negative    She is s/p C6 R-CHOP on 4/21/25 interim PET CT after C3 with response, mild remaining FDG circumferential read as deauville 4; but SUV if 6.2,  End of treatment PET CT  6 weeks post C6 scheduled  Given symptoms of sinusitis/cough and is s/p treatment inthe last two weeks: start augmentin for 7 days.  Patient is neutropenic, reports subjective fevers, but no temperature above 100 since onset of symptoms. Will draw blood cultures.   Will monitor Hep C RNA monthly  ( last 4/15/25 not detected)   PET CT scheduled on 5/27/25 RTC for provider visit 6/13/25.   Over 45 minutes was spent in reviewing labs, counseling patient and coordinating care.

## 2025-05-04 NOTE — PHYSICAL EXAM
[Fully active, able to carry on all pre-disease performance without restriction] : Status 0 - Fully active, able to carry on all pre-disease performance without restriction [Normal] : affect appropriate [de-identified] : Frontal sinus pressure to palpation

## 2025-06-03 NOTE — HISTORY OF PRESENT ILLNESS
[de-identified] : 23 yo F presenting for follow up of her PMBCL   --------------------PmHX:-----------------------------------  No significan PMHx   --------------------Oncologic Hx: -------------------------  Patient  initially presented to [facility] for a 2 week history of progressively worsening neck and facial swelling with associated right shoulder pain. She was found to have a right anterior mediastinal mass with aggressive features, and transferred to [facility] for ongoing management. She reports 2 weeks ago she noticed some bulging in her neck, followed by facial and eye swelling a few days later. She was initially seen in urgent care who prescribed an antibiotic for suspected infection, but had no symptom improvement. Reported difficulty laying flat, and some associated chest discomfort. Reported mild dysphagia.. 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 She was evaluated by gyn for fertility preservation and has received lupron. IR was consulted for line placement, but due to extensive DVT in upper extremity adn swelling, she was unable to get port or UE PICC. To receive C1 of treatment, patient had a femoral PICC line placed which was subsequently removed prior to discharge. She is now s/p 6 cycles of R-CHOP.    --------------------Treatment Hx: ------------------------- C1 in hospital: rituximab on 1/3/25 and CHOP on 1/6/25; C2 1/27/25 C3 2/17/25, C4 on 3/10/24, C5 on 4/1/25, C6 on 4/21/25   --------------------ImagingHx:------------------------------   Pre treatment CT C/A/P (12/23/24) showed Indeterminate right anterior mediastinal mass 8.5 cm x 7.5 cm x 8.9 cm with aggressive features. Mass effect on the superior vena cava from a right anterior mediastinal mass; the SVC is nearly completely obliterated but  demonstrates contrast within its lumen. Some of the right upper lobe pulmonary vasculature and bronchi appears completely obliterated by the mass.   Pre treatment CT Neck: ( 12/23/24) showed extensive diffuse soft tissue edema throughout the deep and superficial neck soft tissues. Increased number of borderline and mildly enlarged lymph nodes throughout the jugular chain and supraclavicular regions bilaterally, suspicious for metastatic disease.  Post C1 CT 1/20/25 (for line placement) Decrease in size of the right anterior mediastinal mass with decreased mass effect and invasion compared to prior.  PET CT s/p 3 cycles: Mildly FDG avid centrally necrotic right mediastinal mass is decreased in size since CT dated 1/20/2025 (SUV 6.2)   PET CT 5/27/25: S/P 6 cycles R-CHOP:  Mildly FDG avid right mediastinal mass is difficult to delineate on CT without IV contrast, decreased in size as compared to prior PET/CT, measuring 0.8 cm in short axis, SUV 3.8 (image 87); previously 1.4 cm in short axis, SUV 6.2). New FDG avid focus in the left paravertebral region adjacent to diaphragm, without changes on CT (SUV 4.2, image 130), differential diagnosis includes brown fat. Lynn 3.   --------------------Pathology Results: -------------------- Initial Pre-treatment Biopsy: Date ---Morphology: extensively necrotic tissue with focal clusters of viable large atypical cells ---IHC stain: diffuse positivity with CD20, Pax5, and CD79a, focal viable cells show staining with Bcl2, Bcl6, Mum1, PDL1. No definitive staining with WILIAM, CD10, CD30 or CD23. Ki67 appears high.  ---FISH: positive for BCL6 (3q27) breakpoint translocation, negative for MYC, BCL2-IGH, or MYC-IGH Rearrangement.  --------------------Social Hx: --------------------------------   [place] patient [place]  [place] lives with mother.  No siblings  Prior smoker; quit since diagnosis    --------------------Surgical History: ------------------------  N/A   --------------------Past family history:---------------------- No family history of cancer  [de-identified] : 6/3/25: Patient is seen for a follow up visit s/p 6 cycles of R-CHOP. She had post treatment PET CT. She reports to be doing well with no complaints.   [90: Able to carry normal activity; minor signs or symptoms of disease.] : 90: Able to carry normal activity; minor signs or symptoms of disease.

## 2025-06-03 NOTE — HISTORY OF PRESENT ILLNESS
[de-identified] : 23 yo F presenting for follow up of her PMBCL   --------------------PmHX:-----------------------------------  No significan PMHx   --------------------Oncologic Hx: -------------------------  Patient  initially presented to [facility] for a 2 week history of progressively worsening neck and facial swelling with associated right shoulder pain. She was found to have a right anterior mediastinal mass with aggressive features, and transferred to [facility] for ongoing management. She reports 2 weeks ago she noticed some bulging in her neck, followed by facial and eye swelling a few days later. She was initially seen in urgent care who prescribed an antibiotic for suspected infection, but had no symptom improvement. Reported difficulty laying flat, and some associated chest discomfort. Reported mild dysphagia.. 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 She was evaluated by gyn for fertility preservation and has received lupron. IR was consulted for line placement, but due to extensive DVT in upper extremity adn swelling, she was unable to get port or UE PICC. To receive C1 of treatment, patient had a femoral PICC line placed which was subsequently removed prior to discharge. She is now s/p 6 cycles of R-CHOP.    --------------------Treatment Hx: ------------------------- C1 in hospital: rituximab on 1/3/25 and CHOP on 1/6/25; C2 1/27/25 C3 2/17/25, C4 on 3/10/24, C5 on 4/1/25, C6 on 4/21/25   --------------------ImagingHx:------------------------------   Pre treatment CT C/A/P (12/23/24) showed Indeterminate right anterior mediastinal mass 8.5 cm x 7.5 cm x 8.9 cm with aggressive features. Mass effect on the superior vena cava from a right anterior mediastinal mass; the SVC is nearly completely obliterated but  demonstrates contrast within its lumen. Some of the right upper lobe pulmonary vasculature and bronchi appears completely obliterated by the mass.   Pre treatment CT Neck: ( 12/23/24) showed extensive diffuse soft tissue edema throughout the deep and superficial neck soft tissues. Increased number of borderline and mildly enlarged lymph nodes throughout the jugular chain and supraclavicular regions bilaterally, suspicious for metastatic disease.  Post C1 CT 1/20/25 (for line placement) Decrease in size of the right anterior mediastinal mass with decreased mass effect and invasion compared to prior.  PET CT s/p 3 cycles: Mildly FDG avid centrally necrotic right mediastinal mass is decreased in size since CT dated 1/20/2025 (SUV 6.2)   PET CT 5/27/25: S/P 6 cycles R-CHOP:  Mildly FDG avid right mediastinal mass is difficult to delineate on CT without IV contrast, decreased in size as compared to prior PET/CT, measuring 0.8 cm in short axis, SUV 3.8 (image 87); previously 1.4 cm in short axis, SUV 6.2). New FDG avid focus in the left paravertebral region adjacent to diaphragm, without changes on CT (SUV 4.2, image 130), differential diagnosis includes brown fat. Lynn 3.   --------------------Pathology Results: -------------------- Initial Pre-treatment Biopsy: Date ---Morphology: extensively necrotic tissue with focal clusters of viable large atypical cells ---IHC stain: diffuse positivity with CD20, Pax5, and CD79a, focal viable cells show staining with Bcl2, Bcl6, Mum1, PDL1. No definitive staining with WILIAM, CD10, CD30 or CD23. Ki67 appears high.  ---FISH: positive for BCL6 (3q27) breakpoint translocation, negative for MYC, BCL2-IGH, or MYC-IGH Rearrangement.  --------------------Social Hx: --------------------------------   [place] patient [place]  [place] lives with mother.  No siblings  Prior smoker; quit since diagnosis    --------------------Surgical History: ------------------------  N/A   --------------------Past family history:---------------------- No family history of cancer  [de-identified] : 6/3/25: Patient is seen for a follow up visit s/p 6 cycles of R-CHOP. She had post treatment PET CT. She reports to be doing well with no complaints.   [90: Able to carry normal activity; minor signs or symptoms of disease.] : 90: Able to carry normal activity; minor signs or symptoms of disease.

## 2025-06-03 NOTE — ASSESSMENT
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  We discussed the role of radiotherapy as consolidation in patients who achieve CMR. Specifically, referencing [id number] Trial Results published Dec 2024 in [employer], we discussed the non-inferior results of omitting RT with a DS-3.  We discussed the risks of RT including heart, lung, thyroid disease and the risk of secondary malignancies.  After extensive discussion, patient to forgo consolidative RT.    Will monitor Hep C RNA monthly  (last 4/15/25 not detected) - ordered today Will order IgG levels as well given treatment with rituximab  RTC in 3 months,  Next CT chest with IV contrast End of october 2025.   Over 45 minutes was spent in reviewing labs, counseling patient and coordinating care.

## 2025-06-03 NOTE — REVIEW OF SYSTEMS
[Dysphagia] : no dysphagia [Loss of Hearing] : no loss of hearing [Nosebleeds] : no nosebleeds [Hoarseness] : no hoarseness [Odynophagia] : no odynophagia [Mucosal Pain] : no mucosal pain [Constipation] : constipation [Diarrhea: Grade 0] : Diarrhea: Grade 0 [Negative] : Allergic/Immunologic

## 2025-07-15 NOTE — ASSESSMENT
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  She is overall feeling well with no new complaints or symptoms.  CBC reviewed with worsening neutropenia - we discussed neulasta injection with repeat labs next week. She is agreeable.  We discussed importance for her to find a physician for follow up.  Plan for neulasta injection this week.   RTC in 3 months,  Next CT chest with IV contrast End of october 2025.   Over 45 minutes was spent in reviewing labs, counseling patient and coordinating care.

## 2025-07-15 NOTE — REVIEW OF SYSTEMS
[Constipation] : constipation [Diarrhea: Grade 0] : Diarrhea: Grade 0 [Negative] : Allergic/Immunologic [Dysphagia] : no dysphagia [Loss of Hearing] : no loss of hearing [Nosebleeds] : no nosebleeds [Hoarseness] : no hoarseness [Odynophagia] : no odynophagia [Mucosal Pain] : no mucosal pain

## 2025-07-15 NOTE — HISTORY OF PRESENT ILLNESS
[90: Able to carry normal activity; minor signs or symptoms of disease.] : 90: Able to carry normal activity; minor signs or symptoms of disease.  [de-identified] : 21 yo F presenting for follow up of her PMBCL   --------------------PmHX:-----------------------------------  No significan PMHx   --------------------Oncologic Hx: -------------------------  Patient  initially presented to [facility] for a 2 week history of progressively worsening neck and facial swelling with associated right shoulder pain. She was found to have a right anterior mediastinal mass with aggressive features, and transferred to [facility] for ongoing management. She reports 2 weeks ago she noticed some bulging in her neck, followed by facial and eye swelling a few days later. She was initially seen in urgent care who prescribed an antibiotic for suspected infection, but had no symptom improvement. Reported difficulty laying flat, and some associated chest discomfort. Reported mild dysphagia.. 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 She was evaluated by gyn for fertility preservation and has received lupron. IR was consulted for line placement, but due to extensive DVT in upper extremity adn swelling, she was unable to get port or UE PICC. To receive C1 of treatment, patient had a femoral PICC line placed which was subsequently removed prior to discharge. She is now s/p 6 cycles of R-CHOP.    --------------------Treatment Hx: ------------------------- C1 in hospital: rituximab on 1/3/25 and CHOP on 1/6/25; C2 1/27/25 C3 2/17/25, C4 on 3/10/24, C5 on 4/1/25, C6 on 4/21/25   --------------------ImagingHx:------------------------------   Pre treatment CT C/A/P (12/23/24) showed Indeterminate right anterior mediastinal mass 8.5 cm x 7.5 cm x 8.9 cm with aggressive features. Mass effect on the superior vena cava from a right anterior mediastinal mass; the SVC is nearly completely obliterated but  demonstrates contrast within its lumen. Some of the right upper lobe pulmonary vasculature and bronchi appears completely obliterated by the mass.   Pre treatment CT Neck: ( 12/23/24) showed extensive diffuse soft tissue edema throughout the deep and superficial neck soft tissues. Increased number of borderline and mildly enlarged lymph nodes throughout the jugular chain and supraclavicular regions bilaterally, suspicious for metastatic disease.  Post C1 CT 1/20/25 (for line placement) Decrease in size of the right anterior mediastinal mass with decreased mass effect and invasion compared to prior.  PET CT s/p 3 cycles: Mildly FDG avid centrally necrotic right mediastinal mass is decreased in size since CT dated 1/20/2025 (SUV 6.2)   PET CT 5/27/25: S/P 6 cycles R-CHOP:  Mildly FDG avid right mediastinal mass is difficult to delineate on CT without IV contrast, decreased in size as compared to prior PET/CT, measuring 0.8 cm in short axis, SUV 3.8 (image 87); previously 1.4 cm in short axis, SUV 6.2). New FDG avid focus in the left paravertebral region adjacent to diaphragm, without changes on CT (SUV 4.2, image 130), differential diagnosis includes brown fat. Lynn 3.   --------------------Pathology Results: -------------------- Initial Pre-treatment Biopsy: Date ---Morphology: extensively necrotic tissue with focal clusters of viable large atypical cells ---IHC stain: diffuse positivity with CD20, Pax5, and CD79a, focal viable cells show staining with Bcl2, Bcl6, Mum1, PDL1. No definitive staining with WILIAM, CD10, CD30 or CD23. Ki67 appears high.  ---FISH: positive for BCL6 (3q27) breakpoint translocation, negative for MYC, BCL2-IGH, or MYC-IGH Rearrangement.  --------------------Social Hx: --------------------------------   [place] lives with mother.  No siblings  Prior smoker; quit since diagnosis    --------------------Surgical History: ------------------------  N/A   --------------------Past family history:---------------------- No family history of cancer  [de-identified] : 6/3/25: Patient is seen for a follow up visit s/p 6 cycles of R-CHOP. She had post treatment PET CT. She reports to be doing well with no complaints.